# Patient Record
Sex: FEMALE | Race: WHITE | URBAN - NONMETROPOLITAN AREA
[De-identification: names, ages, dates, MRNs, and addresses within clinical notes are randomized per-mention and may not be internally consistent; named-entity substitution may affect disease eponyms.]

---

## 2022-08-01 ENCOUNTER — OFFICE VISIT (OUTPATIENT)
Dept: URBAN - NONMETROPOLITAN AREA CLINIC 3 | Facility: CLINIC | Age: 57
End: 2022-08-01
Payer: COMMERCIAL

## 2022-08-01 DIAGNOSIS — G45.3 AMAUROSIS FUGAX: ICD-10-CM

## 2022-08-01 DIAGNOSIS — H25.13 AGE-RELATED NUCLEAR CATARACT, BILATERAL: ICD-10-CM

## 2022-08-01 DIAGNOSIS — H35.031 HYPERTENSIVE RETINOPATHY, RIGHT EYE: Primary | ICD-10-CM

## 2022-08-01 PROCEDURE — 92004 COMPRE OPH EXAM NEW PT 1/>: CPT | Performed by: STUDENT IN AN ORGANIZED HEALTH CARE EDUCATION/TRAINING PROGRAM

## 2022-08-01 ASSESSMENT — INTRAOCULAR PRESSURE
OD: 15
OS: 16

## 2022-08-01 NOTE — IMPRESSION/PLAN
Impression: Hypertensive retinopathy, right eye: H35.031. Plan: Patient education regarding findings. Continue to observe. Call is any worsening.

## 2022-08-01 NOTE — IMPRESSION/PLAN
Impression: Amaurosis fugax: G45.3.
-Pt has hx of plaque OD. Iliac stents and aortic aneurysm placed 2021. Plan: Pt ed on findings. No treatment is required at this time. Will continue to observe condition and or symptoms. Call if [address] worsens. No new plaques noted on exam. Patient ed on s/s TIA. RTC STAT if noted. Recommend f/u in 6 months.